# Patient Record
Sex: MALE | Race: ASIAN | NOT HISPANIC OR LATINO | Employment: PART TIME | URBAN - METROPOLITAN AREA
[De-identification: names, ages, dates, MRNs, and addresses within clinical notes are randomized per-mention and may not be internally consistent; named-entity substitution may affect disease eponyms.]

---

## 2018-03-28 ENCOUNTER — OFFICE VISIT - HEALTHEAST (OUTPATIENT)
Dept: FAMILY MEDICINE | Facility: CLINIC | Age: 33
End: 2018-03-28

## 2018-03-28 DIAGNOSIS — L30.9 ECZEMA OF BOTH HANDS: ICD-10-CM

## 2018-03-28 DIAGNOSIS — H11.009 PTERYGIUM: ICD-10-CM

## 2018-03-28 DIAGNOSIS — Z00.00 ROUTINE GENERAL MEDICAL EXAMINATION AT A HEALTH CARE FACILITY: ICD-10-CM

## 2018-03-28 LAB
ALBUMIN SERPL-MCNC: 4.3 G/DL (ref 3.5–5)
ALP SERPL-CCNC: 132 U/L (ref 45–120)
ALT SERPL W P-5'-P-CCNC: 27 U/L (ref 0–45)
ANION GAP SERPL CALCULATED.3IONS-SCNC: 14 MMOL/L (ref 5–18)
AST SERPL W P-5'-P-CCNC: 20 U/L (ref 0–40)
BILIRUB SERPL-MCNC: 0.3 MG/DL (ref 0–1)
BUN SERPL-MCNC: 14 MG/DL (ref 8–22)
CALCIUM SERPL-MCNC: 9.4 MG/DL (ref 8.5–10.5)
CHLORIDE BLD-SCNC: 105 MMOL/L (ref 98–107)
CHOLEST SERPL-MCNC: 218 MG/DL
CO2 SERPL-SCNC: 21 MMOL/L (ref 22–31)
CREAT SERPL-MCNC: 0.9 MG/DL (ref 0.7–1.3)
ERYTHROCYTE [DISTWIDTH] IN BLOOD BY AUTOMATED COUNT: 12.1 % (ref 11–14.5)
FASTING STATUS PATIENT QL REPORTED: NO
GFR SERPL CREATININE-BSD FRML MDRD: >60 ML/MIN/1.73M2
GLUCOSE BLD-MCNC: 115 MG/DL (ref 70–125)
HCT VFR BLD AUTO: 50.2 % (ref 40–54)
HDLC SERPL-MCNC: 46 MG/DL
HGB BLD-MCNC: 16.1 G/DL (ref 14–18)
LDLC SERPL CALC-MCNC: 106 MG/DL
MCH RBC QN AUTO: 29.6 PG (ref 27–34)
MCHC RBC AUTO-ENTMCNC: 32.2 G/DL (ref 32–36)
MCV RBC AUTO: 92 FL (ref 80–100)
PLATELET # BLD AUTO: 330 THOU/UL (ref 140–440)
PMV BLD AUTO: 7.9 FL (ref 7–10)
POTASSIUM BLD-SCNC: 3.8 MMOL/L (ref 3.5–5)
PROT SERPL-MCNC: 8 G/DL (ref 6–8)
RBC # BLD AUTO: 5.45 MILL/UL (ref 4.4–6.2)
SODIUM SERPL-SCNC: 140 MMOL/L (ref 136–145)
TRIGL SERPL-MCNC: 332 MG/DL
WBC: 9 THOU/UL (ref 4–11)

## 2018-03-28 ASSESSMENT — MIFFLIN-ST. JEOR: SCORE: 1577.42

## 2018-03-29 ENCOUNTER — COMMUNICATION - HEALTHEAST (OUTPATIENT)
Dept: FAMILY MEDICINE | Facility: CLINIC | Age: 33
End: 2018-03-29

## 2019-08-01 ENCOUNTER — COMMUNICATION - HEALTHEAST (OUTPATIENT)
Dept: TELEHEALTH | Facility: CLINIC | Age: 34
End: 2019-08-01

## 2019-08-01 ENCOUNTER — OFFICE VISIT - HEALTHEAST (OUTPATIENT)
Dept: FAMILY MEDICINE | Facility: CLINIC | Age: 34
End: 2019-08-01

## 2019-08-01 DIAGNOSIS — L30.9 ECZEMA OF BOTH HANDS: ICD-10-CM

## 2019-08-01 DIAGNOSIS — E78.1 HYPERTRIGLYCERIDEMIA: ICD-10-CM

## 2019-08-01 DIAGNOSIS — Z00.00 ROUTINE GENERAL MEDICAL EXAMINATION AT A HEALTH CARE FACILITY: ICD-10-CM

## 2019-08-01 DIAGNOSIS — H11.003 PTERYGIUM OF BOTH EYES: ICD-10-CM

## 2019-08-01 LAB
CHOLEST SERPL-MCNC: 192 MG/DL
FASTING STATUS PATIENT QL REPORTED: NO
HDLC SERPL-MCNC: 59 MG/DL
LDLC SERPL CALC-MCNC: 116 MG/DL
TRIGL SERPL-MCNC: 86 MG/DL

## 2019-08-01 ASSESSMENT — MIFFLIN-ST. JEOR: SCORE: 1527.53

## 2019-10-18 ENCOUNTER — OFFICE VISIT - HEALTHEAST (OUTPATIENT)
Dept: FAMILY MEDICINE | Facility: CLINIC | Age: 34
End: 2019-10-18

## 2019-10-18 DIAGNOSIS — J36 PERITONSILLAR ABSCESS: ICD-10-CM

## 2019-10-18 LAB
APTT PPP: 36 SECONDS (ref 24–37)
DEPRECATED S PYO AG THROAT QL EIA: NORMAL
ERYTHROCYTE [DISTWIDTH] IN BLOOD BY AUTOMATED COUNT: 12.1 % (ref 11–14.5)
HCT VFR BLD AUTO: 46.9 % (ref 40–54)
HGB BLD-MCNC: 16.2 G/DL (ref 14–18)
INR PPP: 1.04 (ref 0.9–1.1)
MCH RBC QN AUTO: 31.1 PG (ref 27–34)
MCHC RBC AUTO-ENTMCNC: 34.5 G/DL (ref 32–36)
MCV RBC AUTO: 90 FL (ref 80–100)
PLATELET # BLD AUTO: 330 THOU/UL (ref 140–440)
PMV BLD AUTO: 7.6 FL (ref 7–10)
RBC # BLD AUTO: 5.19 MILL/UL (ref 4.4–6.2)
WBC: 16.3 THOU/UL (ref 4–11)

## 2019-10-19 ENCOUNTER — COMMUNICATION - HEALTHEAST (OUTPATIENT)
Dept: SCHEDULING | Facility: CLINIC | Age: 34
End: 2019-10-19

## 2019-10-19 LAB — GROUP A STREP BY PCR: ABNORMAL

## 2020-07-24 ENCOUNTER — OFFICE VISIT - HEALTHEAST (OUTPATIENT)
Dept: FAMILY MEDICINE | Facility: CLINIC | Age: 35
End: 2020-07-24

## 2020-07-24 ENCOUNTER — COMMUNICATION - HEALTHEAST (OUTPATIENT)
Dept: FAMILY MEDICINE | Facility: CLINIC | Age: 35
End: 2020-07-24

## 2020-07-24 DIAGNOSIS — L30.1 DYSHIDROTIC ECZEMA: ICD-10-CM

## 2020-07-24 DIAGNOSIS — L30.9 ECZEMA OF BOTH HANDS: ICD-10-CM

## 2020-07-24 RX ORDER — CLOBETASOL PROPIONATE 0.5 MG/G
CREAM TOPICAL
Qty: 30 G | Refills: 4 | Status: SHIPPED | OUTPATIENT
Start: 2020-07-24 | End: 2022-03-07

## 2020-11-06 ENCOUNTER — COMMUNICATION - HEALTHEAST (OUTPATIENT)
Dept: FAMILY MEDICINE | Facility: CLINIC | Age: 35
End: 2020-11-06

## 2021-05-31 NOTE — PROGRESS NOTES
"Subjective: Patient comes in for a physical is a 33-year-old male.  First visit was in March 2018 with a physical.    Labs then were normal other than lipids with total cholesterol 218 triglyceride 332 HDL 46 .  This was rechecked today    Patient has bilateral pterygium which is unchanged.  Denies any problems from that    Also has some intermittent dermatitis I did refill the clobetasol he has some patches of some dry slightly or skin.    No additional concerns or issues no change in family history.    Needed a form filled out that he came in for preventative care.    Tobacco status: He  reports that he has never smoked. He has never used smokeless tobacco.    There are no active problems to display for this patient.      Current Outpatient Medications   Medication Sig Dispense Refill     clobetasol (TEMOVATE) 0.05 % cream Apply bid to affected skin 30 g 2     No current facility-administered medications for this visit.        ROS:   10 point review of systems positive as outlined above otherwise negative    Objective:    /73 (Patient Site: Left Arm, Patient Position: Sitting, Cuff Size: Adult Regular)   Pulse (!) 48   Temp 97.7  F (36.5  C) (Oral)   Resp 20   Ht 5' 3.5\" (1.613 m)   Wt 152 lb (68.9 kg)   BMI 26.50 kg/m    Body mass index is 26.5 kg/m .      General appearance no acute distress vital signs are stable    Heart rate by the nurse 48 when I listen around 55-60    Weight is stable    HEENT neck negative no adenopathy or bruit canals and TMs normal oropharynx is clear pupils react normally  Eye exam had some medial pterygium as before, no change.  Bilateral.  Lungs clear no rales or rhonchi heart regular S1-S2 no murmur    Skin is normal except for some dry patches as outlined above.    No joint redness warmth or swelling    Abdomen soft nontender no guarding rebound no axillary inguinal adenopathy.    Testes descended normally no evidence of hernia.    No joint redness warmth or " swelling, normal gait    Lipids look better with total cholesterol 192 triglyceride on 86 HDL 59     Results for orders placed or performed in visit on 08/01/19   Lipid Cascade RANDOM   Result Value Ref Range    Cholesterol 192 <=199 mg/dL    Triglycerides 86 <=149 mg/dL    HDL Cholesterol 59 >=40 mg/dL    LDL Calculated 116 <=129 mg/dL    Patient Fasting > 8hrs? No        Assessment:  1. Routine general medical examination at a health care facility     2. Eczema of both hands  clobetasol (TEMOVATE) 0.05 % cream   3. Hypertriglyceridemia  Lipid Cascade RANDOM   4. Pterygium of both eyes       Physical stable, hypertriglyceridemia resolved    Patient is on a better diet.    Eczema he use clobetasol as needed        Plan: Recheck in 1 year    This transcription uses voice recognition software, which may contain typographical errors.

## 2021-06-01 VITALS — HEIGHT: 64 IN | WEIGHT: 163 LBS | BODY MASS INDEX: 27.83 KG/M2

## 2021-06-02 NOTE — TELEPHONE ENCOUNTER
I received a call requesting that we check whether this positive strep result was addressed. When looking at the chart, provider had concerns for possible peritonsillar abscess.     Through use of  I was able to find out that someone, unsure if it was ENT or someone else but patient was prescribed  Clindamycin 150 mg, Three times a day for 10 days. He is feeling better today. Patient then disconnected call.

## 2021-06-02 NOTE — PROGRESS NOTES
Assessment/ Plan  1. Peritonsillar abscess versus cellulitis  I think patient needs attempted aspiration today- imaging if negative.  Contacted [facility], they have an appointment available at 3 PM this afternoon which is 2-1/2 hours from now.  I have placed a call for the provider-Dr. Gore  I believe-to give them a heads up.  In the meantime, patient's airway is patent and he appears well-hydrated.  He is agreeable to the plan.  - Rapid Strep A Screen-Throat  - Group A Strep, RNA Direct Detection, Throat  - HM2(CBC w/o Differential)  - INR  - APTT(PTT)    Body mass index is 25.37 kg/m .    Subjective  CC:  Chief Complaint   Patient presents with     Fever     poss tonsilitis     HPI:  Sore Throat  Narrative: 1 week of symptoms  --------------------  Duration: 1 week  Onset: sudden  Severity: severe   Symmetric? R side only  Associated Symptoms...  Fever? yes  Runny Nose? no  Cough? no  Headache or abdominal pain? yes  Other Symptoms?  Pain with swallowing saliva and food   *  Comments: Recent with previous history of strep and reports that he had an abscess in his throat in the past when he was living in [facility].  Generally healthy.  There is no problem list on file for this patient.    Current medications reviewed as follows:  Current Outpatient Medications on File Prior to Visit   Medication Sig     clobetasol (TEMOVATE) 0.05 % cream Apply bid to affected skin     No current facility-administered medications on file prior to visit.      Social History     Tobacco Use   Smoking Status Never Smoker   Smokeless Tobacco Never Used     Social History     Patient does not qualify to have social determinant information on file (likely too young).   Social History Narrative     Not on file     Patient Care Team:  Felix Lowery MD as PCP - General (Family Medicine)  Felix Lowery MD as Assigned PCP  ROS  As above  Urinating regularly, and full to swallow but able to do so.  No respiratory symptoms.  Plus  fever and headaches      Objective  Physical Exam  Vitals:    10/18/19 1200   BP: 102/80   Patient Site: Left Arm   Patient Position: Sitting   Cuff Size: Adult Regular   Pulse: 94   Resp: 16   Temp: 99.4  F (37.4  C)   TempSrc: Oral   Weight: 145 lb 8 oz (66 kg)     Unilateral pharyngitis/tonsillitis, right side with markedly deviated uvula, asymmetric swelling.  Jaw is mobile, no other oral abnormality noted.  Minimal if any exudate.  No stridor.  Patient was spitting in a Kleenex    TMs are normal, chest is clear, cardiovascular exam is normal.  Patient does not appear toxic  Diagnostics  Strep is negative, CBC, INR and PTT are pending    Please note: Voice recognition software was used in this dictation.  It may therefore contain typographical errors.

## 2021-06-03 VITALS — BODY MASS INDEX: 25.95 KG/M2 | HEIGHT: 64 IN | WEIGHT: 152 LBS

## 2021-06-03 VITALS
HEART RATE: 94 BPM | TEMPERATURE: 99.4 F | BODY MASS INDEX: 25.37 KG/M2 | DIASTOLIC BLOOD PRESSURE: 80 MMHG | WEIGHT: 145.5 LBS | RESPIRATION RATE: 16 BRPM | SYSTOLIC BLOOD PRESSURE: 102 MMHG

## 2021-06-04 VITALS
DIASTOLIC BLOOD PRESSURE: 68 MMHG | RESPIRATION RATE: 20 BRPM | WEIGHT: 147 LBS | SYSTOLIC BLOOD PRESSURE: 112 MMHG | BODY MASS INDEX: 25.63 KG/M2 | HEART RATE: 66 BPM

## 2021-06-09 NOTE — TELEPHONE ENCOUNTER
Who is calling:  galindo  Reason for Call:  Patient called stating [employer] has faxed over [facility] paperwork. He is calling to confirm that it has been received.  Date of last appointment with primary care: 7/24/2020  Okay to leave a detailed message: Yes

## 2021-06-09 NOTE — PROGRESS NOTES
Assessment/ Plan  1. Dyshidrotic eczema  As most likely trigger for eczema.  Patient does wear occlusive vinyl gloves at work for a few hours each day.  Fabric gloves are available, recommend switching to this  Renewed Temovate cream  Could represent irritant contact eczema or asked to ptotic eczema though less likely.  Discussed avoiding harsh soaps, and using moisturizer  - clobetasoL (TEMOVATE) 0.05 % cream; Apply bid to affected skin  Dispense: 30 g; Refill: 4  - ceramides 1,3,6-11 (CERAVE) Crea cream; Use as directed  Dispense: 300 g; Refill: 6      Subjective    Chief Complaint   Patient presents with     Medication Refill       HPI  Patient is a 34-year-old who has longstanding intermittent history of eczema on his hands.  Gets bumps that come and go.  Seem to come at random, quite pruritic, in the past has used medicated cream which does help.  No fissuring, no dryness.  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  Current Outpatient Medications on File Prior to Visit   Medication Sig     [DISCONTINUED] clobetasol (TEMOVATE) 0.05 % cream Apply bid to affected skin     No current facility-administered medications on file prior to visit.      There is no problem list on file for this patient.    No past surgical history on file.  No family history on file.    ROS  As listed above    Objective  Physical Exam  Vitals:    07/24/20 1425   BP: 112/68   Patient Site: Right Arm   Patient Position: Sitting   Cuff Size: Adult Regular   Pulse: 66   Resp: 20   Weight: 147 lb (66.7 kg)     Fingers have tiny little bumps, background of erythema, minimal if any epithelial breakdown.  Nothing on the palms of the hands, nails appear normal.  More proximal skin and skin elsewhere appears normal    Please note: Voice recognition software was used in this dictation.  It may therefore contain typographical errors.

## 2021-06-10 NOTE — TELEPHONE ENCOUNTER
Dr. Lozada does not have these forms. You will have to call and get the [facility] paperwork.

## 2021-06-10 NOTE — TELEPHONE ENCOUNTER
I have not seen any forms.  I do not recall talking the patient about any FMLA forms    I last saw the patient in August 2019.    He did see Dr. Lozada last week 7/24/2020, so you might want to check with Dr. Lozada.

## 2021-06-10 NOTE — TELEPHONE ENCOUNTER
Called and spoke with Faye Parks he will talk with dale to refax the form. Will be on the lookout for it.

## 2021-06-10 NOTE — TELEPHONE ENCOUNTER
Please see the beginning of this message, Dr. Lozada would be the one to fill out this FMLA because I did not see the patient last week.    I gave the form back to my nurse oneyda

## 2021-06-12 NOTE — TELEPHONE ENCOUNTER
Called and left message for pt#1 Please let pt know [employer] paper work was filled out and faxed. Would pt like a copy? Please obtain information upon return call.

## 2021-06-17 NOTE — PROGRESS NOTES
"Subjective: This patient comes in for evaluation of his first time the clinic for this patient he needed a physical for work    He is born in [facility] came to [place] .  He has had no surgeries no hospitalizations no medical problems other than some intermittent hand dermatitis.    He is using his son's cream his son has eczema.    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    He has 4 children and lives with her mother.    Patient's family history his father  age 60 in [place] question because    Mother has hypertension and CVA age 74 still living    He has 7 sisters and 2 brothers who are healthy.    Otherwise denies any additional problems or issues.    Tobacco status: He  reports that he has never smoked. He has never used smokeless tobacco.    There are no active problems to display for this patient.      Current Outpatient Prescriptions   Medication Sig Dispense Refill     clobetasol (TEMOVATE) 0.05 % cream Apply bid to affected skin 30 g 0     No current facility-administered medications for this visit.        ROS:   10 point review of systems negative other than as outlined above    Objective:    /74 (Patient Site: Left Arm, Patient Position: Sitting, Cuff Size: Adult Large)  Pulse 60  Temp 97  F (36.1  C) (Oral)   Resp 16  Ht 5' 3.5\" (1.613 m)  Wt 163 lb (73.9 kg)  SpO2 95% Comment: at rest with room air  BMI 28.42 kg/m2  Body mass index is 28.42 kg/(m^2).      General appearance no acute distress    Vital signs are stable afebrile.  He states his weight is stable    O2 sat looked good    HEENT canals and TMs normal oropharynx is clear pupils react normally    He has a pterygium left eye medial nontender no symptoms.    Neck negative no adenopathy neck without bruit    Lungs clear no rales or rhonchi, heart was regular rate in the 60-70 range     Abdomen soft nontender no masses no axillary or inguinal " adenopathy    Back without tenderness no asymmetry.    Genitalia normal descended testes no evidence of hernia    Extremities without edema pulses normal    Skin patient on his left hand had about 4 areas were fingers had some evidence of dry scaly cracked skin consistent with eczema.  Right hand had a couple small spots also.    Skin otherwise normal    Labs CBC CMP lipid pending        No results found for this or any previous visit.    Assessment:  1. Routine general medical examination at a health care facility  Tdap vaccine,  6yo or older,  IM    Comprehensive Metabolic Panel    HM2(CBC w/o Differential)    Lipid Cascade RANDOM   2. Eczema of both hands  clobetasol (TEMOVATE) 0.05 % cream   3. Pterygium       TDA P immunization given    Stable physical    Eczema use clobetasol cream twice a day if not resolved in the next 3 weeks follow-up    Benign pterygium left eye    Screening labs.    Plan: As outlined above    This transcription uses voice recognition software, which may contain typographical errors.

## 2021-11-23 ENCOUNTER — IMMUNIZATION (OUTPATIENT)
Dept: NURSING | Facility: CLINIC | Age: 36
End: 2021-11-23
Payer: OTHER GOVERNMENT

## 2021-11-23 PROCEDURE — 91300 PR COVID VAC PFIZER DIL RECON 30 MCG/0.3 ML IM: CPT

## 2021-11-23 PROCEDURE — 0004A PR COVID VAC PFIZER DIL RECON 30 MCG/0.3 ML IM: CPT

## 2022-03-07 ENCOUNTER — OFFICE VISIT (OUTPATIENT)
Dept: FAMILY MEDICINE | Facility: CLINIC | Age: 37
End: 2022-03-07
Payer: COMMERCIAL

## 2022-03-07 VITALS
HEART RATE: 80 BPM | RESPIRATION RATE: 16 BRPM | OXYGEN SATURATION: 96 % | BODY MASS INDEX: 25.34 KG/M2 | SYSTOLIC BLOOD PRESSURE: 108 MMHG | DIASTOLIC BLOOD PRESSURE: 72 MMHG | WEIGHT: 145.31 LBS | TEMPERATURE: 98.6 F

## 2022-03-07 DIAGNOSIS — L30.9 ECZEMA OF BOTH HANDS: ICD-10-CM

## 2022-03-07 DIAGNOSIS — R10.9 RIGHT FLANK PAIN: Primary | ICD-10-CM

## 2022-03-07 LAB
ALBUMIN UR-MCNC: NEGATIVE MG/DL
ANION GAP SERPL CALCULATED.3IONS-SCNC: 13 MMOL/L (ref 5–18)
APPEARANCE UR: CLEAR
BACTERIA #/AREA URNS HPF: ABNORMAL /HPF
BILIRUB UR QL STRIP: NEGATIVE
BUN SERPL-MCNC: 11 MG/DL (ref 8–22)
CALCIUM SERPL-MCNC: 9.8 MG/DL (ref 8.5–10.5)
CHLORIDE BLD-SCNC: 104 MMOL/L (ref 98–107)
CO2 SERPL-SCNC: 26 MMOL/L (ref 22–31)
COLOR UR AUTO: YELLOW
CREAT SERPL-MCNC: 0.97 MG/DL (ref 0.7–1.3)
ERYTHROCYTE [DISTWIDTH] IN BLOOD BY AUTOMATED COUNT: 12.4 % (ref 10–15)
GFR SERPL CREATININE-BSD FRML MDRD: >90 ML/MIN/1.73M2
GLUCOSE BLD-MCNC: 75 MG/DL (ref 70–125)
GLUCOSE UR STRIP-MCNC: NEGATIVE MG/DL
HCT VFR BLD AUTO: 45.5 % (ref 40–53)
HGB BLD-MCNC: 15.2 G/DL (ref 13.3–17.7)
HGB UR QL STRIP: ABNORMAL
KETONES UR STRIP-MCNC: NEGATIVE MG/DL
LEUKOCYTE ESTERASE UR QL STRIP: NEGATIVE
MCH RBC QN AUTO: 30.5 PG (ref 26.5–33)
MCHC RBC AUTO-ENTMCNC: 33.4 G/DL (ref 31.5–36.5)
MCV RBC AUTO: 91 FL (ref 78–100)
NITRATE UR QL: NEGATIVE
PH UR STRIP: 6.5 [PH] (ref 5–8)
PLATELET # BLD AUTO: 368 10E3/UL (ref 150–450)
POTASSIUM BLD-SCNC: 4.1 MMOL/L (ref 3.5–5)
RBC # BLD AUTO: 4.98 10E6/UL (ref 4.4–5.9)
RBC #/AREA URNS AUTO: ABNORMAL /HPF
SODIUM SERPL-SCNC: 143 MMOL/L (ref 136–145)
SP GR UR STRIP: 1.01 (ref 1–1.03)
SQUAMOUS #/AREA URNS AUTO: ABNORMAL /LPF
UROBILINOGEN UR STRIP-ACNC: 0.2 E.U./DL
WBC # BLD AUTO: 10.5 10E3/UL (ref 4–11)
WBC #/AREA URNS AUTO: ABNORMAL /HPF

## 2022-03-07 PROCEDURE — 36415 COLL VENOUS BLD VENIPUNCTURE: CPT | Performed by: FAMILY MEDICINE

## 2022-03-07 PROCEDURE — 85027 COMPLETE CBC AUTOMATED: CPT | Performed by: FAMILY MEDICINE

## 2022-03-07 PROCEDURE — 99213 OFFICE O/P EST LOW 20 MIN: CPT | Performed by: FAMILY MEDICINE

## 2022-03-07 PROCEDURE — 81001 URINALYSIS AUTO W/SCOPE: CPT | Performed by: FAMILY MEDICINE

## 2022-03-07 PROCEDURE — 80048 BASIC METABOLIC PNL TOTAL CA: CPT | Performed by: FAMILY MEDICINE

## 2022-03-07 RX ORDER — CLOBETASOL PROPIONATE 0.5 MG/G
CREAM TOPICAL
Qty: 30 G | Refills: 2 | Status: SHIPPED | OUTPATIENT
Start: 2022-03-07 | End: 2022-08-04

## 2022-03-08 NOTE — PROGRESS NOTES
OFFICE VISIT - FAMILY MEDICINE     ASSESSMENT AND PLAN       ICD-10-CM    1. Right flank pain  R10.9 UA reflex to Microscopic and Culture     CBC with platelets     Basic metabolic panel  (Ca, Cl, CO2, Creat, Gluc, K, Na, BUN)     CBC with platelets     Basic metabolic panel  (Ca, Cl, CO2, Creat, Gluc, K, Na, BUN)     UA reflex to Microscopic and Culture     Urine Microscopic   2. Eczema of both hands  L30.9 clobetasol (TEMOVATE) 0.05 % external cream   Back and right flank pain, differential diagnosis discussed including musculoskeletal pain, kidney stone etc., urine did show trace of blood, microscopic exam was negative for red blood cells in the urine, management discussed with the patient, He stated that his pain is already improving with hydration and activity modification,he prefers to hold on any imaging at this time, and I think that is reasonable. we did review worsening kidney stone or musculoskeletal back pain symptoms and to seek prompt medical attention.  Dyshidrotic dermatitis of the hands, I did refill his steroid cream, I did go over possible side effect of the cream.  Patient instructed to schedule an appointment with PCP for follow-up and physical.     CHIEF COMPLAINT   back pain (EAR CHECK)       CAROLINE Parks is a 36 year old male.  No Patient Care Coordination Note on file.    Patient is here today complaining of right back pain for about 4 to 5 days, mild throbbing pain localized on the right side area, denies any difficulty with urination, no nausea vomiting or right lower abdominal pain.  He does mention a history of kidney stone about 3 years ago, he was able to pass the kidney stone with fluid only.  He also complains of itchy palms of hands, has been prescribed a cream in the past that did help, is asking for a refill.    Review of Systems As per HPI, otherwise negative.    OBJECTIVE   /72   Pulse 80   Temp 98.6  F (37  C) (Oral)   Resp 16   Wt 65.9 kg (145 lb 5 oz)   SpO2 96%    BMI 25.34 kg/m    Physical Exam  Constitutional:       Appearance: Normal appearance.   HENT:      Head: Normocephalic and atraumatic.   Cardiovascular:      Rate and Rhythm: Normal rate and regular rhythm.   Pulmonary:      Effort: Pulmonary effort is normal.      Breath sounds: Normal breath sounds.   Musculoskeletal:         General: Tenderness (Right mid lumbar paraspinal muscle.) present. No swelling.      Cervical back: Normal range of motion and neck supple.   Neurological:      General: No focal deficit present.      Mental Status: He is alert and oriented to person, place, and time.   Psychiatric:         Behavior: Behavior normal.         Thought Content: Thought content normal.         Judgment: Judgment normal.         [facility]   No family history on file.  Social History     Socioeconomic History     Marital status: Single     Spouse name: Not on file     Number of children: Not on file     Years of education: Not on file     Highest education level: Not on file   Occupational History     Not on file   Tobacco Use     Smoking status: Never Smoker     Smokeless tobacco: Never Used   Substance and Sexual Activity     Alcohol use: Not on file     Drug use: Not on file     Sexual activity: Not on file   Other Topics Concern     Not on file   Social History Narrative     Not on file     Social Determinants of Health     Financial Resource Strain: Not on file   Food Insecurity: Not on file   Transportation Needs: Not on file   Physical Activity: Not on file   Stress: Not on file   Social Connections: Not on file   Intimate Partner Violence: Not on file   Housing Stability: Not on file       PMSH   [unfilled]  No past surgical history on file.    RESULTS/CONSULTS (Lab/Rad)     Recent Results (from the past 168 hour(s))   CBC with platelets   Result Value Ref Range    WBC Count 10.5 4.0 - 11.0 10e3/uL    RBC Count 4.98 4.40 - 5.90 10e6/uL    Hemoglobin 15.2 13.3 - 17.7 g/dL    Hematocrit 45.5 40.0 - 53.0 %     MCV 91 78 - 100 fL    MCH 30.5 26.5 - 33.0 pg    MCHC 33.4 31.5 - 36.5 g/dL    RDW 12.4 10.0 - 15.0 %    Platelet Count 368 150 - 450 10e3/uL   Basic metabolic panel  (Ca, Cl, CO2, Creat, Gluc, K, Na, BUN)   Result Value Ref Range    Sodium 143 136 - 145 mmol/L    Potassium 4.1 3.5 - 5.0 mmol/L    Chloride 104 98 - 107 mmol/L    Carbon Dioxide (CO2) 26 22 - 31 mmol/L    Anion Gap 13 5 - 18 mmol/L    Urea Nitrogen 11 8 - 22 mg/dL    Creatinine 0.97 0.70 - 1.30 mg/dL    Calcium 9.8 8.5 - 10.5 mg/dL    Glucose 75 70 - 125 mg/dL    GFR Estimate >90 >60 mL/min/1.73m2   UA reflex to Microscopic and Culture    Specimen: Urine, Midstream   Result Value Ref Range    Color Urine Yellow Colorless, Straw, Light Yellow, Yellow    Appearance Urine Clear Clear    Glucose Urine Negative Negative mg/dL    Bilirubin Urine Negative Negative    Ketones Urine Negative Negative mg/dL    Specific Gravity Urine 1.015 1.005 - 1.030    Blood Urine Trace (A) Negative    pH Urine 6.5 5.0 - 8.0    Protein Albumin Urine Negative Negative mg/dL    Urobilinogen Urine 0.2 0.2, 1.0 E.U./dL    Nitrite Urine Negative Negative    Leukocyte Esterase Urine Negative Negative   Urine Microscopic   Result Value Ref Range    Bacteria Urine Few (A) None Seen /HPF    RBC Urine 0-2 0-2 /HPF /HPF    WBC Urine None Seen 0-5 /HPF /HPF    Squamous Epithelials Urine Few (A) None Seen /LPF     No image results found.     [unfilled]    HEALTH MAINTENANCE / SCREENING   [unfilled], [unfilled],[unfilled]  Immunization History   Administered Date(s) Administered     COVID-19,PF,Pfizer (12+ Yrs) 04/03/2021, 04/22/2021, 11/23/2021     HepB, Unspecified 05/24/2004     HepB-Adult 01/19/2005, 07/23/2005     Influenza (IIV3) PF 10/30/2009, 11/11/2010     MMR 05/24/2004, 01/19/2005     Polio, Unspecified  05/24/2004     Td (Adult), Adsorbed 05/24/2004, 01/19/2005, 08/02/2005     Tdap (Adacel,Boostrix) 03/28/2018     Varicella 05/24/2004, 01/19/2005      Health Maintenance   Topic     ADVANCE CARE PLANNING      HIV SCREENING      HEPATITIS C SCREENING      PREVENTIVE CARE VISIT      INFLUENZA VACCINE (1)     PHQ-2 (once per calendar year)      ANNUAL REVIEW OF HM ORDERS      LIPID      DTAP/TDAP/TD IMMUNIZATION (5 - Td or Tdap)     HEPATITIS B IMMUNIZATION      COVID-19 Vaccine      Pneumococcal Vaccine: Pediatrics (0 to 5 Years) and At-Risk Patients (6 to 64 Years)      IPV IMMUNIZATION      MENINGITIS IMMUNIZATION      Review of external notes as documented elsewhere in note  25 minutes spent on the date of the encounter doing chart review, review of outside records, review of test results, interpretation of tests, patient visit and documentation          Rudy Suarez MD  [facility]   This transcription uses voice recognition software, which may contain typographical errors.

## 2022-04-26 ENCOUNTER — OFFICE VISIT (OUTPATIENT)
Dept: FAMILY MEDICINE | Facility: CLINIC | Age: 37
End: 2022-04-26
Payer: COMMERCIAL

## 2022-04-26 VITALS
DIASTOLIC BLOOD PRESSURE: 78 MMHG | HEIGHT: 65 IN | WEIGHT: 145 LBS | HEART RATE: 88 BPM | BODY MASS INDEX: 24.16 KG/M2 | SYSTOLIC BLOOD PRESSURE: 108 MMHG | OXYGEN SATURATION: 96 % | RESPIRATION RATE: 20 BRPM

## 2022-04-26 DIAGNOSIS — R06.00 DYSPNEA, UNSPECIFIED TYPE: ICD-10-CM

## 2022-04-26 DIAGNOSIS — F32.A DEPRESSION, UNSPECIFIED DEPRESSION TYPE: ICD-10-CM

## 2022-04-26 DIAGNOSIS — Z11.59 NEED FOR HEPATITIS C SCREENING TEST: ICD-10-CM

## 2022-04-26 DIAGNOSIS — Z00.00 ROUTINE HISTORY AND PHYSICAL EXAMINATION OF ADULT: Primary | ICD-10-CM

## 2022-04-26 DIAGNOSIS — Z11.4 SCREENING FOR HIV (HUMAN IMMUNODEFICIENCY VIRUS): ICD-10-CM

## 2022-04-26 LAB
ALBUMIN SERPL-MCNC: 4.4 G/DL (ref 3.5–5)
ALP SERPL-CCNC: 126 U/L (ref 45–120)
ALT SERPL W P-5'-P-CCNC: 28 U/L (ref 0–45)
AST SERPL W P-5'-P-CCNC: 20 U/L (ref 0–40)
BILIRUB DIRECT SERPL-MCNC: <0.1 MG/DL
BILIRUB SERPL-MCNC: 0.2 MG/DL (ref 0–1)
BNP SERPL-MCNC: <10 PG/ML (ref 0–35)
CHOLEST SERPL-MCNC: 174 MG/DL
FASTING STATUS PATIENT QL REPORTED: NO
HDLC SERPL-MCNC: 56 MG/DL
LDLC SERPL CALC-MCNC: 77 MG/DL
PROT SERPL-MCNC: 7.8 G/DL (ref 6–8)
TRIGL SERPL-MCNC: 204 MG/DL
TSH SERPL DL<=0.005 MIU/L-ACNC: 0.99 UIU/ML (ref 0.3–5)

## 2022-04-26 PROCEDURE — 87389 HIV-1 AG W/HIV-1&-2 AB AG IA: CPT | Performed by: FAMILY MEDICINE

## 2022-04-26 PROCEDURE — 99395 PREV VISIT EST AGE 18-39: CPT | Performed by: FAMILY MEDICINE

## 2022-04-26 PROCEDURE — 83880 ASSAY OF NATRIURETIC PEPTIDE: CPT | Performed by: FAMILY MEDICINE

## 2022-04-26 PROCEDURE — 36415 COLL VENOUS BLD VENIPUNCTURE: CPT | Performed by: FAMILY MEDICINE

## 2022-04-26 PROCEDURE — 80061 LIPID PANEL: CPT | Performed by: FAMILY MEDICINE

## 2022-04-26 PROCEDURE — 86803 HEPATITIS C AB TEST: CPT | Performed by: FAMILY MEDICINE

## 2022-04-26 PROCEDURE — 80076 HEPATIC FUNCTION PANEL: CPT | Performed by: FAMILY MEDICINE

## 2022-04-26 PROCEDURE — 84443 ASSAY THYROID STIM HORMONE: CPT | Performed by: FAMILY MEDICINE

## 2022-04-26 ASSESSMENT — PATIENT HEALTH QUESTIONNAIRE - PHQ9
SUM OF ALL RESPONSES TO PHQ QUESTIONS 1-9: 19
SUM OF ALL RESPONSES TO PHQ QUESTIONS 1-9: 19
10. IF YOU CHECKED OFF ANY PROBLEMS, HOW DIFFICULT HAVE THESE PROBLEMS MADE IT FOR YOU TO DO YOUR WORK, TAKE CARE OF THINGS AT HOME, OR GET ALONG WITH OTHER PEOPLE: VERY DIFFICULT

## 2022-04-26 NOTE — PROGRESS NOTES
Assessment & Plan        ICD-10-CM    1. Routine history and physical examination of adult  Z00.00 Lipid Profile (Chol, Trig, HDL, LDL calc)     TSH     Hepatic panel (Albumin, ALT, AST, Bili, Alk Phos, TP)   2. Screening for HIV (human immunodeficiency virus)  Z11.4 HIV Antigen Antibody Combo   3. Need for hepatitis C screening test  Z11.59 Hepatitis C Screen Reflex to HCV RNA Quant and Genotype   4. Dyspnea, unspecified type  R06.00 B-Type Natriuretic Peptide ( [place] Only)     XR Chest 2 Views   5. Depression, unspecified depression type  F32.A Adult Mental Health  Referral          Physical stable.    Depression symptoms we will have him see counseling.    He understands if symptoms worsen any suicidal thoughts or tendencies he will go to the emergency room.    Shortness of breath with activity in the past few months we will check chest x-ray BNP and monitor exam was normal O2 sat look good    Screening lab work as outlined.    Patient be contacted with the lab results, chest x-ray and discuss follow-up.      Return in about 6 months (around 10/26/2022) for Follow up. for recheck.        Subjective:    This 36 year old male was seen today for a physical.    Patient did have some concerns though.  He has had some shortness of breath with activity over the past couple months we will check BNP and chest x-ray.  He does have some anxiety and depression symptoms his PHQ-9 was 19.  Question #9 was 2.  He has no present suicide plan or thoughts.  He states that he is talked to his brother and does feel little better.  He feels safe    His wife, Vicente, was present and she felt he has been depressed but does feel he is safe as well    After discussion elected to have him get some counseling help and referrals placed    Labs today include hepatitis C HIV lipid TSH BNP and LFTs.    Labs from 3/7/2022 showed normal urine BMP and CBC.  He had had some flank pain which is now resolved    We will also get a chest  "x-ray.    Patient is up-to-date on immunizations.    No additional concern or issue.    Blood pressure initially 143/53 on recheck 108/78.    O2 sat look good at 96%      Review of Systems    10 point review of systems positive as outlined above otherwise negative    Current Outpatient Medications   Medication     ceramides 1,3,6-11 (CERAVE) Crea cream     clobetasol (TEMOVATE) 0.05 % external cream     No current facility-administered medications for this visit.       Objective    Vitals: /78 (BP Location: Right arm, Patient Position: Sitting, Cuff Size: Adult Regular)   Pulse 88   Resp 20   Ht 1.651 m (5' 5\")   Wt 65.8 kg (145 lb)   SpO2 96%   BMI 24.13 kg/m    BMI= Body mass index is 24.13 kg/m .     Physical Exam    General appearance no acute distress    Vital signs stable, recheck blood pressure look good    HEENT oropharynx is clear neck nontender no thyroid fullness no adenopathy    Canals and TMs normal nose was clear    Lungs clear throughout no rales or rhonchi O2 sat 96%    Heart was regular S1-S2 rate at 80.    Abdomen nontender    No chest pain    No skin changes    Testes descended normally no evidence of hernia    Extremities without edema.    No joint redness warmth or swelling.    Chest x-ray and labs pending as outlined above        Felix Lowery MD   Answers for HPI/ROS submitted by the patient on 4/26/2022  If you checked off any problems, how difficult have these problems made it for you to do your work, take care of things at home, or get along with other people?: Very difficult  PHQ9 TOTAL SCORE: 19  Frequency of exercise:: None  Getting at least 3 servings of Calcium per day:: Yes  Diet:: Regular (no restrictions)  Taking medications regularly:: Not Applicable  Medication side effects:: Not applicable  Bi-annual eye exam:: NO  Dental care twice a year:: NO  Sleep apnea or symptoms of sleep apnea:: None  Additional concerns today:: No      "

## 2022-04-27 LAB
HCV AB SERPL QL IA: NONREACTIVE
HIV 1+2 AB+HIV1 P24 AG SERPL QL IA: NEGATIVE

## 2022-04-27 ASSESSMENT — PATIENT HEALTH QUESTIONNAIRE - PHQ9: SUM OF ALL RESPONSES TO PHQ QUESTIONS 1-9: 19

## 2022-04-28 ENCOUNTER — TELEPHONE (OUTPATIENT)
Dept: FAMILY MEDICINE | Facility: CLINIC | Age: 37
End: 2022-04-28
Payer: COMMERCIAL

## 2022-04-28 NOTE — LETTER
May 2, 2022      Faye Parks  [address] [facility] [address]        Dear ,    We are writing to inform you of your test results.    Your labs look good, no evidence of hepatitis C or HIV.     The thyroid was normal     The cholesterol level looked good, other than triglycerides a little high at 204. Try to decrease the carbohydrates in the diet, less rice bread sugar Posta sweets, desserts etc.     Liver tests looked okay.     The blood test regarding the heart was normal and the chest x-ray showed the lungs to be normal.     If you continue to have shortness of breath please let us know, monitor things over the next couple months.     Referral was placed for mental health counseling.  Next week And let us know as well    Resulted Orders   HIV Antigen Antibody Combo   Result Value Ref Range    HIV Antigen Antibody Combo Negative Negative   Hepatitis C Screen Reflex to HCV RNA Quant and Genotype   Result Value Ref Range    Hepatitis C Antibody Nonreactive Nonreactive    Narrative    Assay performance characteristics have not been established for newborns, infants, and children.   B-Type Natriuretic Peptide ([facility] Only)   Result Value Ref Range    BNP <10 0 - 35 pg/mL   Lipid Profile (Chol, Trig, HDL, LDL calc)   Result Value Ref Range    Cholesterol 174 <=199 mg/dL    Triglycerides 204 (H) <=149 mg/dL    Direct Measure HDL 56 >=40 mg/dL      Comment:      HDL Cholesterol Reference Range:     0-2 years:   No reference ranges established for patients under 2 years old  at [facility] for lipid analytes.    2-8 years:  Greater than 45 mg/dL     18 years and older:   Female: Greater than or equal to 50 mg/dL   Male:   Greater than or equal to 40 mg/dL    LDL Cholesterol Calculated 77 <=129 mg/dL    Patient Fasting > 8hrs? No    TSH   Result Value Ref Range    TSH 0.99 0.30 - 5.00 uIU/mL   Hepatic panel (Albumin, ALT, AST, Bili, Alk Phos, TP)   Result Value Ref Range    Bilirubin Total 0.2 0.0 - 1.0  mg/dL    Bilirubin Direct <0.1 <=0.5 mg/dL    Protein Total 7.8 6.0 - 8.0 g/dL    Albumin 4.4 3.5 - 5.0 g/dL    Alkaline Phosphatase 126 (H) 45 - 120 U/L    AST 20 0 - 40 U/L    ALT 28 0 - 45 U/L       If you have any questions or concerns, please call the clinic at the number listed above.       Sincerely,      Dr. Lowery

## 2022-04-28 NOTE — TELEPHONE ENCOUNTER
Attempted Leave message x 1 unable due to mailbox not set up. Please review message thread below and advise the patient as indicated. Please schedule if necessary or indicated in message thread.

## 2022-04-28 NOTE — TELEPHONE ENCOUNTER
----- Message from Felix Lowery MD sent at 4/28/2022  9:19 AM CDT -----  Please contact this patient.  Let him know that the labs look good no evidence of hepatitis C or HIV.    The thyroid was normal    The cholesterol level looked good other than triglycerides a little high at 204 try to decrease the carbohydrates in the diet, less rice bread sugar Posta sweets, desserts etc.    Liver tests looked okay.    The blood test regarding the heart was normal and the chest x-ray showed the lungs to be normal.    If he continues to have shortness of breath have him let us know, monitor things over the next couple months.    Referral was placed for mental health counseling.  Next week And let us know as well

## 2022-05-19 ENCOUNTER — TELEPHONE (OUTPATIENT)
Dept: FAMILY MEDICINE | Facility: CLINIC | Age: 37
End: 2022-05-19
Payer: COMMERCIAL

## 2022-05-19 NOTE — TELEPHONE ENCOUNTER
Reason for Call:  Other Forms questions     Detailed comments: Pt called wanted know if provider will be able to fill out a form for a Service Pet. Pt indicated that he has Mental Health appt in September and he don't think he can wait til then to get forms filled out. Pt is in need of a Service Pet to help with his depression and wondering if provider can help filled out form. Please look into pt's request and follow back up with pt at your earliest convenient. Thank you.      Phone Number Patient can be reached at: Home number on file [phone] (home)    Best Time: anytime     Can we leave a detailed message on this number? YES    Call taken on 5/19/2022 at 10:08 AM by Kevin Max

## 2022-05-25 ENCOUNTER — OFFICE VISIT (OUTPATIENT)
Dept: FAMILY MEDICINE | Facility: CLINIC | Age: 37
End: 2022-05-25
Payer: COMMERCIAL

## 2022-05-25 VITALS
WEIGHT: 150 LBS | RESPIRATION RATE: 18 BRPM | HEART RATE: 67 BPM | SYSTOLIC BLOOD PRESSURE: 113 MMHG | DIASTOLIC BLOOD PRESSURE: 66 MMHG | BODY MASS INDEX: 24.96 KG/M2

## 2022-05-25 DIAGNOSIS — F32.A DEPRESSION, UNSPECIFIED DEPRESSION TYPE: Primary | ICD-10-CM

## 2022-05-25 DIAGNOSIS — F41.9 ANXIETY: ICD-10-CM

## 2022-05-25 PROCEDURE — 99213 OFFICE O/P EST LOW 20 MIN: CPT | Performed by: FAMILY MEDICINE

## 2022-05-25 NOTE — LETTER
Regarding: Faye Parks    YOB: 1985        To whom it may concern,    This patient suffers from significant depression and anxiety.    He benefits from a service/ animal.    Please accommodate him by allowing him to bring his dog onto the premises.        Sincerely,    Felix Lowery MD    Electronically signed on 5/25/2022

## 2022-05-25 NOTE — PROGRESS NOTES
TraumaAssessment & Plan        ICD-10-CM    1. Depression, unspecified depression type  F32.A    2. Anxiety  F41.9         Depression and anxiety    Letter regarding service/ animal, see under letters.    Await counseling      Total time with the patient: 20 minutes, which included previsit chart review, office visit, documentation, coordination of care.        Subjective:    This 36 year old male was seen today for follow-up.  He has depression and anxiety.    I did refer to counseling.  Please see previous discussion    He is here with his wife, Vicente.  No suicidal thoughts or tendencies.    Await upcoming counseling    He wondered about a service/ animal.  He has a dog and this is been very helpful for him    He lives in a facility that needs a letter to accommodate him to bring his dog on the premises.  I did give him a letter today and filled out a form for him.        Review of Systems    Review of systems positive as outlined above otherwise negative    Current Outpatient Medications   Medication     ceramides 1,3,6-11 (CERAVE) Crea cream     clobetasol (TEMOVATE) 0.05 % external cream     No current facility-administered medications for this visit.       Objective    Vitals: /66 (BP Location: Right arm, Patient Position: Sitting, Cuff Size: Adult Regular)   Pulse 67   Resp 18   Wt 68 kg (150 lb)   BMI 24.96 kg/m    BMI= Body mass index is 24.96 kg/m .     Physical Exam    No additional exam        Felix Lowery MD

## 2022-08-03 DIAGNOSIS — L30.9 ECZEMA OF BOTH HANDS: ICD-10-CM

## 2022-08-04 RX ORDER — CLOBETASOL PROPIONATE 0.5 MG/G
CREAM TOPICAL
Qty: 30 G | Refills: 2 | Status: SHIPPED | OUTPATIENT
Start: 2022-08-04

## 2022-08-04 NOTE — TELEPHONE ENCOUNTER
Routing refill request to provider for review/approval because:  Drug not on the [facility] refill protocol     Last Written Prescription Date:  3/7/22  Last Fill Quantity: 30,  # refills: 2   Last office visit provider:  5/25/22     Requested Prescriptions   Pending Prescriptions Disp Refills     clobetasol (TEMOVATE) 0.05 % external cream [Pharmacy Med Name: CLOBETASOL PROP 0.05% CREAM 30GM] 30 g 2     Sig: APPLY TO AFFECTED AREA TWICE A DAY FOR 10 DAYS       There is no refill protocol information for this order          Yamile Lucas, RN 08/03/22 10:07 PM

## 2022-10-02 ENCOUNTER — HEALTH MAINTENANCE LETTER (OUTPATIENT)
Age: 37
End: 2022-10-02

## 2023-06-04 ENCOUNTER — HEALTH MAINTENANCE LETTER (OUTPATIENT)
Age: 38
End: 2023-06-04

## 2023-08-26 DIAGNOSIS — L30.9 ECZEMA OF BOTH HANDS: ICD-10-CM

## 2023-08-26 NOTE — TELEPHONE ENCOUNTER
Clinic staff,  Please route to covering provider as PCP is no longer at the clinic.     Petra Marques RN  [facility] Nurse Advisor  8/26/2023 11:48 AM

## 2023-08-26 NOTE — TELEPHONE ENCOUNTER
Routing refill request to provider for review/approval because:  Drug not on the [facility] refill protocol   Pt has not been seen in > 1 year   PCP no longer at the clinic    Last Written Prescription Date: 08/04/2022  Last Fill Quantity: 30g,  # refills: 2   Last office visit provider:  05/25/2022    Requested Prescriptions   Pending Prescriptions Disp Refills    clobetasol (TEMOVATE) 0.05 % external cream [Pharmacy Med Name: CLOBETASOL PROP 0.05% CREAM 30GM] 30 g 2     Sig: APPLY TOPICALLY TO THE AFFECTED AREA TWICE DAILY FOR 10 DAYS       There is no refill protocol information for this order          Petra Marques RN 08/26/23 11:33 AM

## 2023-08-26 NOTE — LETTER
August 30, 2023      Faye aPrks  [address] [place] [id number]        As a valued [facility] patient, your healthcare needs are our priority.    Your health care team has determined that you are due for an appointment for a medication check.   We encourage you to call or schedule an appointment with your primary care provider to discuss your overdue screening and schedule an appointment.     If you already have had your screening performed at another health care facility, please ask that practice to send your results to [facility] [phone] and we will update your health records. This will ensure you receive the best possible care from our providers.      If you have any questions or need help with scheduling, please call the [facility] at [phone].        Yours in health,       Your care team at [facility]

## 2023-08-28 RX ORDER — CLOBETASOL PROPIONATE 0.5 MG/G
CREAM TOPICAL
Qty: 30 G | Refills: 2 | OUTPATIENT
Start: 2023-08-28

## 2023-08-28 NOTE — TELEPHONE ENCOUNTER
Spoke with pt, and pt asked to call back later, otherwise he'll call back and scheduled appt. Will check tomorrow

## 2023-08-29 NOTE — TELEPHONE ENCOUNTER
LMTCB #2. Postponing task to tomorrow to try again. If patient returns call back, please help patient schedule an appointment per message below. Thanks!

## 2024-07-27 ENCOUNTER — HEALTH MAINTENANCE LETTER (OUTPATIENT)
Age: 39
End: 2024-07-27